# Patient Record
Sex: FEMALE | Race: BLACK OR AFRICAN AMERICAN | NOT HISPANIC OR LATINO | Employment: FULL TIME | RURAL
[De-identification: names, ages, dates, MRNs, and addresses within clinical notes are randomized per-mention and may not be internally consistent; named-entity substitution may affect disease eponyms.]

---

## 2017-10-25 ENCOUNTER — HISTORICAL (OUTPATIENT)
Dept: ADMINISTRATIVE | Facility: HOSPITAL | Age: 22
End: 2017-10-25

## 2017-10-30 LAB
LAB AP GENERAL CAT - HISTORICAL: NORMAL
LAB AP INTERPRETATION/RESULT - HISTORICAL: NEGATIVE
LAB AP SPECIMEN ADEQUACY - HISTORICAL: NORMAL
LAB AP SPECIMEN SUBMITTED - HISTORICAL: NORMAL

## 2021-08-20 ENCOUNTER — LAB VISIT (OUTPATIENT)
Dept: PRIMARY CARE CLINIC | Facility: CLINIC | Age: 26
End: 2021-08-20

## 2021-08-20 PROCEDURE — 99000 PR SPECIMEN HANDLING,DR OFF->LAB: ICD-10-PCS | Mod: ,,, | Performed by: NURSE PRACTITIONER

## 2021-08-20 PROCEDURE — 99000 SPECIMEN HANDLING OFFICE-LAB: CPT | Mod: ,,, | Performed by: NURSE PRACTITIONER

## 2023-10-12 ENCOUNTER — HOSPITAL ENCOUNTER (EMERGENCY)
Facility: HOSPITAL | Age: 28
Discharge: HOME | End: 2023-10-12
Attending: EMERGENCY MEDICINE
Payer: MEDICAID

## 2023-10-12 VITALS
WEIGHT: 155.65 LBS | HEART RATE: 78 BPM | RESPIRATION RATE: 18 BRPM | HEIGHT: 64 IN | TEMPERATURE: 98.8 F | DIASTOLIC BLOOD PRESSURE: 64 MMHG | OXYGEN SATURATION: 99 % | SYSTOLIC BLOOD PRESSURE: 111 MMHG | BODY MASS INDEX: 26.57 KG/M2

## 2023-10-12 DIAGNOSIS — N61.1 ABSCESS OF NIPPLE, RIGHT: Primary | ICD-10-CM

## 2023-10-12 PROCEDURE — 87070 CULTURE OTHR SPECIMN AEROBIC: CPT

## 2023-10-12 PROCEDURE — 87205 SMEAR GRAM STAIN: CPT

## 2023-10-12 PROCEDURE — 99283 EMERGENCY DEPT VISIT LOW MDM: CPT

## 2023-10-12 RX ORDER — SULFAMETHOXAZOLE AND TRIMETHOPRIM 800; 160 MG/1; MG/1
1 TABLET ORAL 2 TIMES DAILY
Qty: 14 TABLET | Refills: 0 | Status: SHIPPED | OUTPATIENT
Start: 2023-10-12 | End: 2023-10-19

## 2023-10-12 RX ORDER — ACETAMINOPHEN 500 MG
1000 TABLET ORAL ONCE
Status: COMPLETED | OUTPATIENT
Start: 2023-10-12 | End: 2023-10-12

## 2023-10-12 RX ORDER — CEPHALEXIN 500 MG/1
500 CAPSULE ORAL 4 TIMES DAILY
Qty: 28 CAPSULE | Refills: 0 | Status: SHIPPED | OUTPATIENT
Start: 2023-10-12 | End: 2023-10-19

## 2023-10-12 RX ADMIN — ACETAMINOPHEN 1000 MG: 500 TABLET ORAL at 11:57

## 2023-10-12 NOTE — DISCHARGE INSTRUCTIONS
Please  antibiotics take both as prescribed open is  You received: Call from [facility] in 2 to 3 days if antibiotics need to be changed if not please take until finished and follow-up with your PCP once you return back to [place]  Please apply warm compresses to right breast

## 2023-10-12 NOTE — ED PROVIDER NOTES
"Time: 11:19 AM EDT  Date of encounter:  10/12/2023  Independent Historian/Clinical History and Information was obtained by:   Patient    History is limited by: N/A    Chief Complaint   Patient presents with    Breast Pain         History of Present Illness:  Patient is a 27 y.o. year old female who presents to the emergency department for evaluation of right nipple pain and irritation since Friday.  Patient states that she had her nipples pierced about 2 years ago but no longer wears them.  She denies any changes of topicals such as body wash or lotion.  She is from [place] and has a PCP there when she returns back home.  She denies fever, chills, nausea or vomiting.    Patient Care Team  Primary Care Provider: System, Provider Not In    Past Medical History:     Allergies   Allergen Reactions    Penicillins Hives     History reviewed. No pertinent past medical history.  History reviewed. No pertinent surgical history.  History reviewed. No pertinent family history.    Home Medications:  Prior to Admission medications    Not on File        Social History:          Review of Systems:  Review of Systems   Constitutional: Negative.    HENT: Negative.     Eyes: Negative.    Respiratory: Negative.     Cardiovascular: Negative.    Gastrointestinal: Negative.    Endocrine: Negative.    Genitourinary: Negative.    Musculoskeletal: Negative.    Skin: Negative.  Positive for wound.   Allergic/Immunologic: Negative.    Neurological: Negative.    Hematological: Negative.    Psychiatric/Behavioral: Negative.          Physical Exam:  /64 (BP Location: Left arm, Patient Position: Sitting)   Pulse 78   Temp 98.8 øF (37.1 øC) (Oral)   Resp 18   Ht 162.6 cm (64\")   Wt 70.6 kg (155 lb 10.3 oz)   SpO2 99%   BMI 26.72 kg/mý         Physical Exam  Vitals and nursing note reviewed.   Constitutional:       Appearance: Normal appearance. She is normal weight.   HENT:      Head: Normocephalic and atraumatic.      Nose: Nose " normal.      Mouth/Throat:      Mouth: Mucous membranes are moist.   Eyes:      Extraocular Movements: Extraocular movements intact.      Conjunctiva/sclera: Conjunctivae normal.      Pupils: Pupils are equal, round, and reactive to light.   Cardiovascular:      Rate and Rhythm: Normal rate and regular rhythm.      Heart sounds: Normal heart sounds.   Pulmonary:      Effort: Pulmonary effort is normal.      Breath sounds: Normal breath sounds.   Chest:   Breasts:     Right: Tenderness present.      Left: Normal.       Musculoskeletal:         General: Normal range of motion.      Cervical back: Normal range of motion and neck supple.   Skin:     General: Skin is warm and dry.      Findings: Erythema present.   Neurological:      General: No focal deficit present.      Mental Status: She is alert and oriented to person, place, and time.   Psychiatric:         Mood and Affect: Mood normal.         Behavior: Behavior normal.                   Procedures:  Incision & Drainage    Date/Time: 10/12/2023 11:34 AM    Performed by: Daniel Reece PA-C  Authorized by: Hao Carlisle DO    Consent:     Consent obtained:  Verbal    Consent given by:  Patient    Risks discussed:  Incomplete drainage, pain and bleeding  Universal protocol:     Patient identity confirmed:  Verbally with patient  Location:     Type:  Abscess    Size:  Less than 1 cm    Location:  Trunk    Trunk location:  R breast (R nipple)  Pre-procedure details:     Skin preparation:  Povidone-iodine  Anesthesia:     Anesthesia method:  Local infiltration    Local anesthetic:  Lidocaine 1% w/o epi  Procedure type:     Complexity:  Simple  Procedure details:     Ultrasound guidance: no      Needle aspiration: no      Incision types:  Stab incision and single straight    Drainage:  Purulent    Drainage amount:  Moderate    Wound treatment:  Wound left open    Packing materials:  None  Post-procedure details:     Procedure completion:  Tolerated well, no  immediate complications        Medical Decision Making:  Patient is a 27-year-old female presenting today due to right nipple abscess since Friday.  Patient has a history of nipple piercings.  She denies fever, chills, nausea or vomiting.  Did I&D today during the visit with the wound culture sent to lab.  Sent Bactrim and Keflex to pharmacy.    External Notes reviewed:    None      The following orders were placed and all results were independently analyzed by me:  Orders Placed This Encounter   Procedures    Incision & Drainage    Wound Culture - Wound, Breast    Supplies To Bedside - Notify MD When Ready- I&D Tray / Kit; Sterile Gloves: 6       Medications Given in the Emergency Department:  Medications - No data to display     ED Course:    The patient was initially evaluated in the triage area where orders were placed. The patient was later dispositioned by Daniel Reece PA-C.      The patient was advised to stay for completion of workup which includes but is not limited to communication of labs and radiological results, reassessment and plan. The patient was advised that leaving prior to disposition by a provider could result in critical findings that are not communicated to the patient.          Labs:    Lab Results (last 24 hours)       ** No results found for the last 24 hours. **             Imaging:    No Radiology Exams Resulted Within Past 24 Hours      Differential Diagnosis and Discussion:      Abscess: Differential diagnosis for an abscess includes but is not limited to bacterial or fungal infections, foreign body reactions, malignancies, and autoimmune or inflammatory conditions.        [facility]           Patient Care Considerations:    LABS: I considered ordering labs, however patient denies systemic symptoms      Consultants/Shared Management Plan:    None    Social Determinants of Health:    Patient is independent, reliable, and has access to care.       Disposition and Care  Coordination:    Discharged: The patient is suitable and stable for discharge with no need for consideration of observation or admission.    I have explained the patient's condition, diagnoses and treatment plan based on the information available to me at this time. I have answered questions and addressed any concerns. The patient has a good  understanding of the patient's diagnosis, condition, and treatment plan as can be expected at this point. The vital signs have been stable. The patient's condition is stable and appropriate for discharge from the emergency department.      The patient will pursue further outpatient evaluation with the primary care physician or other designated or consulting physician as outlined in the discharge instructions. They are agreeable to this plan of care and follow-up instructions have been explained in detail. The patient has received these instructions in written format and have expressed an understanding of the discharge instructions. The patient is aware that any significant change in condition or worsening of symptoms should prompt an immediate return to this or the closest emergency department or call to 911.  I have explained discharge medications and the need for follow up with the patient/caretakers. This was also printed in the discharge instructions. Patient was discharged with the following medications and follow up:      Medication List        New Prescriptions      cephalexin 500 MG capsule  Commonly known as: KEFLEX  Take 1 capsule by mouth 4 (Four) Times a Day for 7 days.     sulfamethoxazole-trimethoprim 800-160 MG per tablet  Commonly known as: BACTRIM DS,SEPTRA DS  Take 1 tablet by mouth 2 (Two) Times a Day for 7 days.               Where to Get Your Medications        These medications were sent to [facility] [address] [place], [place] [address] [address]  [phone] [facility] [phone] [address] [place] [address]      Phone: [phone]   cephalexin  500 MG capsule  sulfamethoxazole-trimethoprim 800-160 MG per tablet      No follow-up provider specified.     Final diagnoses:   Abscess of nipple, right        ED Disposition       ED Disposition   Discharge    Condition   Stable    Comment   --               This medical record created using voice recognition software.             Daniel Reece PA-C  10/12/23 1137       Daniel Reece PA-C  10/12/23 1138

## 2023-10-12 NOTE — ED NOTES
Patient reports that she previously had nipple piercings, and since Friday, has noticed painful swelling of the right nipple.

## 2023-10-15 LAB
BACTERIA SPEC AEROBE CULT: NORMAL
GRAM STN SPEC: NORMAL
GRAM STN SPEC: NORMAL